# Patient Record
Sex: MALE | Race: WHITE | NOT HISPANIC OR LATINO | Employment: OTHER | URBAN - METROPOLITAN AREA
[De-identification: names, ages, dates, MRNs, and addresses within clinical notes are randomized per-mention and may not be internally consistent; named-entity substitution may affect disease eponyms.]

---

## 2017-07-10 ENCOUNTER — OFFICE VISIT (OUTPATIENT)
Dept: URGENT CARE | Facility: CLINIC | Age: 67
End: 2017-07-10
Payer: MEDICARE

## 2017-07-10 VITALS
HEART RATE: 73 BPM | RESPIRATION RATE: 16 BRPM | TEMPERATURE: 98.1 F | WEIGHT: 195 LBS | SYSTOLIC BLOOD PRESSURE: 128 MMHG | BODY MASS INDEX: 27.3 KG/M2 | DIASTOLIC BLOOD PRESSURE: 86 MMHG | OXYGEN SATURATION: 95 % | HEIGHT: 71 IN

## 2017-07-10 DIAGNOSIS — S86.111A GASTROCNEMIUS MUSCLE TEAR, RIGHT, INITIAL ENCOUNTER: ICD-10-CM

## 2017-07-10 PROCEDURE — 99203 OFFICE O/P NEW LOW 30 MIN: CPT | Performed by: FAMILY MEDICINE

## 2017-07-10 ASSESSMENT — ENCOUNTER SYMPTOMS
NAUSEA: 0
LEG PAIN: 1
VOMITING: 0
FEVER: 0
SHORTNESS OF BREATH: 0

## 2017-07-10 NOTE — MR AVS SNAPSHOT
"        Jimmy Ball   7/10/2017 1:00 PM   Office Visit   MRN: [id number]    Department:  [facility]   Dept Phone:  [phone]    Description:  Male : 1950   Provider:  Alex Pino M.D.           Reason for Visit     Leg Pain x yesterday evening was dancing and felt a pop in back of R leg, cramped on and off all night, still hurting today       Allergies as of 7/10/2017     No Known Allergies      Vital Signs     Blood Pressure Pulse Temperature Respirations Height Weight    128/86 mmHg 73 36.7 °C (98.1 °F) 16 1.803 m (5' 11\") 88.451 kg (195 lb)    Body Mass Index Oxygen Saturation Smoking Status             27.21 kg/m2 95% Former Smoker         Basic Information     Date Of Birth Sex Race Ethnicity Preferred Language    1950 Male White Non- English      Health Maintenance        Date Due Completion Dates    IMM DTaP/Tdap/Td Vaccine (1 - Tdap) 1970 ---    COLONOSCOPY 2001 ---    IMM ZOSTER VACCINE 2011 ---    IMM PNEUMOCOCCAL 65+ (ADULT) LOW/MEDIUM RISK SERIES (1 of 2 - PCV13) 2016 ---    IMM INFLUENZA (1) 2017 ---            Current Immunizations     No immunizations on file.      Below and/or attached are the medications your provider expects you to take. Review all of your home medications and newly ordered medications with your provider and/or pharmacist. Follow medication instructions as directed by your provider and/or pharmacist. Please keep your medication list with you and share with your provider. Update the information when medications are discontinued, doses are changed, or new medications (including over-the-counter products) are added; and carry medication information at all times in the event of emergency situations     Allergies:  No Known Allergies          Medications  Valid as of: July 10, 2017 -  1:47 PM    Generic Name Brand Name Tablet Size Instructions for use    ALPRAZolam   Take  by mouth as needed.         " Hydrocodone-Acetaminophen (Tab) VICODIN ES 7.5-750 MG Take 1 Tab by mouth every 6 hours as needed.          Ibuprofen (Tab) MOTRIN 200 MG Take 400 mg by mouth every 6 hours as needed.          .                 Medicines prescribed today were sent to:     None      Medication refill instructions:       If your prescription bottle indicates you have medication refills left, it is not necessary to call your provider’s office. Please contact your pharmacy and they will refill your medication.    If your prescription bottle indicates you do not have any refills left, you may request refills at any time through one of the following ways: The online [employer] system (except Urgent Care), by calling your provider’s office, or by asking your pharmacy to contact your provider’s office with a refill request. Medication refills are processed only during regular business hours and may not be available until the next business day. Your provider may request additional information or to have a follow-up visit with you prior to refilling your medication.   *Please Note: Medication refills are assigned a new Rx number when refilled electronically. Your pharmacy may indicate that no refills were authorized even though a new prescription for the same medication is available at the pharmacy. Please request the medicine by name with the pharmacy before contacting your provider for a refill.        Instructions    Muscle Tear  A muscle tear is usually caused by over-exertion or stretching. The muscle often takes a while to heal. Muscle tears require 3 to 4 weeks to heal completely. A history of the injury and a physical exam may be performed. Sometimes, the injury is identified with radiographs and an MRI. Treatment for muscle injuries includes:  · Resting and protecting the affected area until pain with motion is gone.  · Putting ice on the injured area.  ¨ Put ice in a bag.  ¨ Place a towel between your skin and the bag.  ¨ Leave the ice  on for 15 to 20 minutes, 3 to 4 times a day.  ¨ After two days, you can use heat to relieve spasms.  · Using compression wraps to help control swelling and limit movement.  · Raising (elevate) the injured area above the level of the heart (if possible) for the first 1 to 2 days after the injury.  · Medicine may be prescribed to reduce pain and inflammation.  Avoid strenuous activities that bring on muscle pain. Exercises to strengthen and stretch the injured muscle can help heal the muscle and prevent repeated injury. After the pain and swelling are gone, you may begin gradual strengthening exercises. Begin range-of-motion exercises and gentle stretching after 3 to 4 days of rest.   SEEK MEDICAL CARE IF:   Your injured muscle is not improving after 1 week of treatment.     This information is not intended to replace advice given to you by your health care provider. Make sure you discuss any questions you have with your health care provider.     Document Released: 01/25/2006 Document Revised: 03/11/2013 Document Reviewed: 07/02/2010  [employer] Interactive Patient Education ©2016 [employer] Inc.            [employer] Access Code: [email or web address]  Expires: 8/9/2017  1:47 PM    Your email address is not on file at [facility].  Email Addresses are required for you to sign up for [employer], please contact [phone] to verify your personal information and to provide your email address prior to attempting to register for [employer].    Jimmy Ball  [address] [address]  [place] [address]    [employer]  A secure, online tool to manage your health information     [facility]’s [employer]® is a secure, online tool that connects you to your personalized health information from the privacy of your home -- day or night - making it very easy for you to manage your healthcare. Once the activation process is completed, you can even access your medical information using the [employer] zo, which is available for free in the Apple  Alicia store or Google Play store.     To learn more about [employer], visit [email or web address]    There are two levels of access available (as shown below):   My Chart Features  [facility]  Specialists [facility]  Urgent  Care Non-[facility] Primary Care Doctor   Email your healthcare team securely and privately 24/7 X X X    Manage appointments: schedule your next appointment; view details of past/upcoming appointments X      Request prescription refills. X      View recent personal medical records, including lab and immunizations X X X X   View health record, including health history, allergies, medications X X X X   Read reports about your outpatient visits, procedures, consult and ER notes X X X X   See your discharge summary, which is a recap of your hospital and/or ER visit that includes your diagnosis, lab results, and care plan X X  X     How to register for [employer]:  Once your e-mail address has been verified, follow the following steps to sign up for [employer].     1. Go to  [email or web address]  2. Click on the Sign Up Now box, which takes you to the New Member Sign Up page. You will need to provide the following information:  a. Enter your [employer] Access Code exactly as it appears at the top of this page. (You will not need to use this code after you’ve completed the sign-up process. If you do not sign up before the expiration date, you must request a new code.)   b. Enter your date of birth.   c. Enter your home email address.   d. Click Submit, and follow the next screen’s instructions.  3. Create a [employer] ID. This will be your [employer] login ID and cannot be changed, so think of one that is secure and easy to remember.  4. Create a [employer] password. You can change your password at any time.  5. Enter your Password Reset Question and Answer. This can be used at a later time if you forget your password.   6. Enter your e-mail address. This allows you to receive e-mail notifications  when new information is available in [employer].  7. Click Sign Up. You can now view your health information.    For assistance activating your [employer] account, call [phone]

## 2017-07-10 NOTE — PATIENT INSTRUCTIONS
Muscle Tear  A muscle tear is usually caused by over-exertion or stretching. The muscle often takes a while to heal. Muscle tears require 3 to 4 weeks to heal completely. A history of the injury and a physical exam may be performed. Sometimes, the injury is identified with radiographs and an MRI. Treatment for muscle injuries includes:  · Resting and protecting the affected area until pain with motion is gone.  · Putting ice on the injured area.  ¨ Put ice in a bag.  ¨ Place a towel between your skin and the bag.  ¨ Leave the ice on for 15 to 20 minutes, 3 to 4 times a day.  ¨ After two days, you can use heat to relieve spasms.  · Using compression wraps to help control swelling and limit movement.  · Raising (elevate) the injured area above the level of the heart (if possible) for the first 1 to 2 days after the injury.  · Medicine may be prescribed to reduce pain and inflammation.  Avoid strenuous activities that bring on muscle pain. Exercises to strengthen and stretch the injured muscle can help heal the muscle and prevent repeated injury. After the pain and swelling are gone, you may begin gradual strengthening exercises. Begin range-of-motion exercises and gentle stretching after 3 to 4 days of rest.   SEEK MEDICAL CARE IF:   Your injured muscle is not improving after 1 week of treatment.     This information is not intended to replace advice given to you by your health care provider. Make sure you discuss any questions you have with your health care provider.     Document Released: 01/25/2006 Document Revised: 03/11/2013 Document Reviewed: 07/02/2010  [employer] Interactive Patient Education ©2016 [employer]

## 2017-07-10 NOTE — PROGRESS NOTES
"Subjective:      Jimmy Ball is a 66 y.o. male who presents with Leg Pain          Leg Pain  This is a new problem. The current episode started yesterday. The problem occurs constantly. The problem has been unchanged. Pertinent negatives include no chest pain, fever, nausea or vomiting.   Right calf pain  Sharp  Medial gastrocnemius   No radiation  Sudden onset while dancing at a block party last night  slept ok cause he took alprazolam and motrin    Review of Systems   Constitutional: Negative for fever.   Respiratory: Negative for shortness of breath.    Cardiovascular: Negative for chest pain.   Gastrointestinal: Negative for nausea and vomiting.     PMH:  has a past medical history of Asthma and Bruxism.  MEDS:   Current outpatient prescriptions:   •  ALPRAZolam (XANAX PO), Take  by mouth as needed.  , Disp: , Rfl:   •  ibuprofen (MOTRIN) 200 MG TABS, Take 400 mg by mouth every 6 hours as needed.  , Disp: , Rfl:   •  hydrocodone-acetaminophen (VICODIN ES) 7.5-750 MG per tablet, Take 1 Tab by mouth every 6 hours as needed.  , Disp: , Rfl:   ALLERGIES: No Known Allergies  SURGHX: History reviewed. No pertinent past surgical history.  SOCHX:  reports that he quit smoking about 20 years ago. He does not have any smokeless tobacco history on file. He reports that he drinks alcohol. He reports that he uses illicit drugs (Marijuana).  FH: Family history was reviewed, no pertinent findings to report       Objective:     /86 mmHg  Pulse 73  Temp(Src) 36.7 °C (98.1 °F)  Resp 16  Ht 1.803 m (5' 11\")  Wt 88.451 kg (195 lb)  BMI 27.21 kg/m2  SpO2 95%     Physical Exam   Constitutional: He appears well-developed.   HENT:   Head: Normocephalic.   Right Ear: External ear normal.   Left Ear: External ear normal.   Mouth/Throat: Oropharyngeal exudate present.   Nasal congestion    Eyes: Right eye exhibits no discharge. Left eye exhibits no discharge.   Neck: Normal range of motion. No tracheal deviation " present. No thyromegaly present.   Cardiovascular: Normal rate.  Exam reveals no friction rub.    No murmur heard.  Pulmonary/Chest: Effort normal. No stridor. No respiratory distress. He has no wheezes.   Abdominal: Soft. He exhibits no distension. There is no tenderness. There is no guarding.   Lymphadenopathy:     He has no cervical adenopathy.   Neurological: He is alert.   Skin: Skin is warm and dry. He is not diaphoretic.   Psychiatric: He has a normal mood and affect. His behavior is normal.     Upon measuring the right calf compared to the left it was 1.5 cm larger  With POSITIVE tenderness at the medial head of the gastrocnemius on the right compared to the left just above the musculotendinous junction at the lower muscle belly  POSITIVE pain with resisted plantar flexion with an extended knee on the right compared to the left  NEGATIVE pain with resisted plantar flexion with a flexed knee bilaterally          Assessment/Plan:     1. Gastrocnemius muscle tear, right, initial encounter       Provided with Cam Walker boot  Activity as tolerated  Recommend following up with a provider in the next 7-10 days since he is returning to [place] where he lives

## 2021-03-09 ENCOUNTER — OFFICE VISIT (OUTPATIENT)
Dept: URGENT CARE | Facility: CLINIC | Age: 71
End: 2021-03-09
Payer: MEDICARE

## 2021-03-09 VITALS
HEART RATE: 81 BPM | WEIGHT: 209.6 LBS | HEIGHT: 71 IN | RESPIRATION RATE: 16 BRPM | OXYGEN SATURATION: 95 % | SYSTOLIC BLOOD PRESSURE: 110 MMHG | BODY MASS INDEX: 29.34 KG/M2 | TEMPERATURE: 97.9 F | DIASTOLIC BLOOD PRESSURE: 80 MMHG

## 2021-03-09 DIAGNOSIS — S61.212A LACERATION OF RIGHT MIDDLE FINGER WITHOUT FOREIGN BODY WITHOUT DAMAGE TO NAIL, INITIAL ENCOUNTER: ICD-10-CM

## 2021-03-09 PROCEDURE — 90714 TD VACC NO PRESV 7 YRS+ IM: CPT | Performed by: NURSE PRACTITIONER

## 2021-03-09 PROCEDURE — 90471 IMMUNIZATION ADMIN: CPT | Performed by: NURSE PRACTITIONER

## 2021-03-09 PROCEDURE — 12002 RPR S/N/AX/GEN/TRNK2.6-7.5CM: CPT | Performed by: NURSE PRACTITIONER

## 2021-03-09 ASSESSMENT — ENCOUNTER SYMPTOMS
BACK PAIN: 0
FEVER: 0
FOCAL WEAKNESS: 0
NECK PAIN: 0
SENSORY CHANGE: 0
WEAKNESS: 0
TINGLING: 0
CHILLS: 0

## 2021-03-09 NOTE — ADDENDUM NOTE
Addended by: RICARDO MORALES on: 3/9/2021 03:59 PM     Modules accepted: Orders, Level of Service

## 2021-03-09 NOTE — PROGRESS NOTES
"Subjective:   Jimmy Ball is a 70 y.o. male who presents for Laceration (the middle finger right hand x 40 min )      HPI  70 year old male in urgent care after sustaining a laceration to the middle finger on the right had 40 minutes ago. Patient states that he has a uncovered blade on a shelf and he was passing his wife a phone  and cut his finger while passing the . No issues with movement, ROM, strength. TDAP possibly in 2017     Review of Systems   Constitutional: Negative for chills, fever and malaise/fatigue.   Musculoskeletal: Negative for back pain, joint pain and neck pain.   Neurological: Negative for tingling, sensory change, focal weakness and weakness.       There is no problem list on file for this patient.    History reviewed. No pertinent surgical history.  Social History     Tobacco Use   • Smoking status: Former Smoker     Quit date: 7/10/1997     Years since quittin.6   • Smokeless tobacco: Never Used   Substance Use Topics   • Alcohol use: Yes     Comment: 1-2 pints of beer per day   • Drug use: Yes     Types: Marijuana     Comment: occasional      History reviewed. No pertinent family history.   (Allergies, Medications, & Tobacco/Substance Use were reconciled by the Medical Assistant and reviewed by myself. The family history is prepopulated)     Objective:     /80   Pulse 81   Temp 36.6 °C (97.9 °F)   Resp 16   Ht 1.803 m (5' 11\")   Wt 95.1 kg (209 lb 9.6 oz)   SpO2 95%   BMI 29.23 kg/m²     Physical Exam  Vitals reviewed.   Constitutional:       Appearance: Normal appearance.   Cardiovascular:      Rate and Rhythm: Normal rate and regular rhythm.      Heart sounds: Normal heart sounds.   Pulmonary:      Effort: Pulmonary effort is normal.      Breath sounds: Normal breath sounds.   Skin:     Capillary Refill: Capillary refill takes less than 2 seconds.      Comments: 3cm full thickness laceration to the dorsal surface of right middle finger without " tendon involvement    Neurological:      Mental Status: He is alert and oriented to person, place, and time.   Psychiatric:         Mood and Affect: Mood normal.         Behavior: Behavior normal.         Thought Content: Thought content normal.         Judgment: Judgment normal.         Assessment/Plan:     1. Laceration of right middle finger without foreign body without damage to nail, initial encounter  Tdap Vaccine =>7YO IM    TD Preservative Free =>9yo IM     Procedure: Laceration Repair  -Risks including bleeding, nerve damage, infection, and poor cosmetic outcome discussed. Benefits and alternatives discussed.   -Clean technique with sterile instruments and suture used  -Local anesthesia with 2% lidocaine  -Closed with #6  4-0 Nylon interrupted sutures with good wound approximation  -Polysporin and dressing placed  -Patient tolerated well    RTC in 7-10 days for suture removal/wound check   Finger splint provided for additional support  TD given due to inability to p precisely recall when last booster was given.    Differential diagnosis, natural history, supportive care, and indications for immediate follow-up discussed.    Advised the patient to follow-up with the primary care physician for recheck, reevaluation, and consideration of further management.    Please note that this dictation was created using voice recognition software. I have made a reasonable attempt to correct obvious errors, but I expect that there are errors of grammar and possibly content that I did not discover before finalizing the note.    This note was electronically signed ADAN Huff